# Patient Record
Sex: FEMALE | Race: WHITE | NOT HISPANIC OR LATINO | Employment: OTHER | URBAN - METROPOLITAN AREA
[De-identification: names, ages, dates, MRNs, and addresses within clinical notes are randomized per-mention and may not be internally consistent; named-entity substitution may affect disease eponyms.]

---

## 2017-01-19 NOTE — PATIENT DISCUSSION
NONPROLIFERATIVE DIABETIC RETINOPATHY OU: STABLE -  RETURN FOR FOLLOW-UP AS SCHEDULED FOR DILATED EYE EXAM.

## 2017-09-13 NOTE — PATIENT DISCUSSION
DIABETIC MACULAR EDEMA, OU:  STABLE - PT ASYMPTOMATIC AND HAS NOT NOTICED ANY CHANGES IN VISION. - NO TREATMENT TODAY. FOLLOW UP AS SCHEDULED.

## 2017-10-11 NOTE — PATIENT DISCUSSION
- Do not think that patient would be able to give a reliable HVF OD, would not be able to see the targets OS.

## 2017-10-11 NOTE — PATIENT DISCUSSION
- Follow up in 3 months for cataract check, or may call to schedule testing visit sooner if she wishes.

## 2017-10-11 NOTE — PATIENT DISCUSSION
- Discussed the a new glasses Rx will not improve her vision, but she would very likely experience a huge visual improvement after cataract surgery.

## 2017-10-11 NOTE — PATIENT DISCUSSION
- If no improvement at next visit will start erythromycin ointment. Must see improvement before planning cataract surgery.

## 2017-10-11 NOTE — PATIENT DISCUSSION
- Patient is generally happy with her vision but is interested in improving her ability to drive and see things at [facility]. Will consider cataract surgery.

## 2017-12-12 ENCOUNTER — CONSULT (OUTPATIENT)
Dept: URBAN - METROPOLITAN AREA CLINIC 43 | Facility: CLINIC | Age: 67
End: 2017-12-12

## 2017-12-12 DIAGNOSIS — H40.1132: ICD-10-CM

## 2017-12-12 DIAGNOSIS — H43.813: ICD-10-CM

## 2017-12-12 PROCEDURE — 2027F OPTIC NERVE HEAD EVAL DONE: CPT

## 2017-12-12 PROCEDURE — 92250 FUNDUS PHOTOGRAPHY W/I&R: CPT

## 2017-12-12 PROCEDURE — G8428 CUR MEDS NOT DOCUMENT: HCPCS

## 2017-12-12 PROCEDURE — 9222550 BILAT EXTENDED OPHTHALMOSCOPY, FIRST

## 2017-12-12 PROCEDURE — 0517F GLAUCOMA PLAN OF CARE DOCD: CPT

## 2017-12-12 PROCEDURE — 1036F TOBACCO NON-USER: CPT

## 2017-12-12 PROCEDURE — G8785 BP SCRN NO PERF AT INTERVAL: HCPCS

## 2017-12-12 PROCEDURE — 3285F IOP DOWN <15% OF PRE-SVC LVL: CPT

## 2017-12-12 PROCEDURE — 92014 COMPRE OPH EXAM EST PT 1/>: CPT

## 2017-12-12 ASSESSMENT — TONOMETRY
OS_IOP_MMHG: 10
OD_IOP_MMHG: 10

## 2017-12-12 ASSESSMENT — VISUAL ACUITY
OS_SC: 20/30-2
OD_CC: 20/20-2
OS_CC: 20/20-2
OD_SC: J6
OD_SC: 20/20
OS_CC: J1
OD_CC: J1
OS_SC: J6

## 2017-12-15 NOTE — PATIENT DISCUSSION
- I provided her with a new Rx for full frame reading glasses.  She understands that these glasses will not improve her near vision due to her advanced cataracts

## 2017-12-15 NOTE — PATIENT DISCUSSION
- Discussed with the patient in multiple different ways that a new glasses Rx will not improve her vision, but she would very likely experience a huge visual improvement after cataract surgery.

## 2018-01-10 ENCOUNTER — IOP CHECK (OUTPATIENT)
Dept: URBAN - METROPOLITAN AREA CLINIC 39 | Facility: CLINIC | Age: 68
End: 2018-01-10

## 2018-01-10 DIAGNOSIS — H40.1132: ICD-10-CM

## 2018-01-10 PROCEDURE — G8785 BP SCRN NO PERF AT INTERVAL: HCPCS

## 2018-01-10 PROCEDURE — 92012 INTRM OPH EXAM EST PATIENT: CPT

## 2018-01-10 PROCEDURE — 2027F OPTIC NERVE HEAD EVAL DONE: CPT

## 2018-01-10 PROCEDURE — G8428 CUR MEDS NOT DOCUMENT: HCPCS

## 2018-01-10 PROCEDURE — 1036F TOBACCO NON-USER: CPT

## 2018-01-10 PROCEDURE — 92133 CPTRZD OPH DX IMG PST SGM ON: CPT

## 2018-01-10 PROCEDURE — 3284F IOP RED >=15% PRE-NTRV LVL: CPT

## 2018-01-10 ASSESSMENT — TONOMETRY
OS_IOP_MMHG: 11
OD_IOP_MMHG: 10

## 2018-01-10 ASSESSMENT — VISUAL ACUITY
OD_CC: 20/25+2
OS_CC: 20/25+2

## 2018-01-18 NOTE — PATIENT DISCUSSION
DIABETIC MACULAR EDEMA, OD: INCREASED EDEMA TODAY -   AVASTIN (1.25MG) INTRAVITREAL INJECTION TODAY. RETURN AS SCHEDULED.

## 2018-01-31 NOTE — PATIENT DISCUSSION
- I discussed a variety of IOL options with the patient and provided her with written information.  Discussed standard monofocal, multifocal, extended depth of focus, and toric IOL options

## 2018-01-31 NOTE — PATIENT DISCUSSION
- Discussed the risks, benefits, and alternatives of cataract surgery including but not limited to infection, bleeding, posterior capsular tear, need for additional surgery including secondary IOL or vitrectomy, the continued need to wear glasses at both distance and near, and permanent loss of vision. There is no guarantee that cataract surgery will improve the patient's vision or reduce glare/halos.  The patient appears to understand and wishes to proceed with surgery, OD first.

## 2018-02-09 NOTE — PATIENT DISCUSSION
- Significant corneal astigmatism OD&gt;OS. Patient elects not to correct this with a toric IOL or an LRI. She understands that she will likely need glasses for both distance and near after surgery.

## 2018-02-28 NOTE — PATIENT DISCUSSION
New Prescription: ofloxacin (ofloxacin): drops: 0.3% 1 drop four times a day as directed into right eye 02-

## 2018-02-28 NOTE — PATIENT DISCUSSION
New Prescription: prednisolone acetate (prednisolone acetate): drops,suspension: 1% 1 drop four times a day as directed into right eye 02-

## 2018-02-28 NOTE — PATIENT DISCUSSION
- Pred forte QID (sent repeat Rx to patient's pharmacy, discussed the importance of starting this drop as soon as possible)

## 2018-02-28 NOTE — PATIENT DISCUSSION
New Prescription: ketorolac (ketorolac): drops: 0.5% 1 drop four times a day as directed into right eye 02-

## 2018-03-01 NOTE — PATIENT DISCUSSION
- Patient has been completely noncomplaint with her postop drops, has not used Pred forte or Besivance at all since surgery

## 2018-03-01 NOTE — PATIENT DISCUSSION
- Provided detailed written instructions in two formats and discussed drop schedule at length with patient and a family member

## 2018-03-29 NOTE — PATIENT DISCUSSION
- Patient is happy without glasses for now.  She will wear OTC reading glasses for near work, recommend +2.50-2.75

## 2018-04-26 ENCOUNTER — IOP CHECK (OUTPATIENT)
Dept: URBAN - METROPOLITAN AREA CLINIC 43 | Facility: CLINIC | Age: 68
End: 2018-04-26

## 2018-04-26 DIAGNOSIS — H40.1132: ICD-10-CM

## 2018-04-26 PROCEDURE — 2027F OPTIC NERVE HEAD EVAL DONE: CPT

## 2018-04-26 PROCEDURE — G8428 CUR MEDS NOT DOCUMENT: HCPCS

## 2018-04-26 PROCEDURE — G8785 BP SCRN NO PERF AT INTERVAL: HCPCS

## 2018-04-26 PROCEDURE — 3284F IOP RED >=15% PRE-NTRV LVL: CPT

## 2018-04-26 PROCEDURE — 92012 INTRM OPH EXAM EST PATIENT: CPT

## 2018-04-26 PROCEDURE — 1036F TOBACCO NON-USER: CPT

## 2018-04-26 ASSESSMENT — VISUAL ACUITY
OS_SC: 20/30-1
OD_SC: 20/25-1
OS_PH: 20/25

## 2018-04-26 ASSESSMENT — TONOMETRY
OD_IOP_MMHG: 11
OS_IOP_MMHG: 11

## 2018-05-10 NOTE — PATIENT DISCUSSION
DIABETIC MACULAR EDEMA, OU:  STABLE EDEMA - PT FEELS VISION IS GOOD- NO TREATMENT TODAY. FOLLOW UP AS SCHEDULED.

## 2018-08-03 NOTE — PATIENT DISCUSSION
New Prescription: latanoprost (latanoprost): drops: 0.005% 1 drop at bedtime as directed into both eyes 08-

## 2018-12-03 ENCOUNTER — ESTABLISHED COMPREHENSIVE EXAM (OUTPATIENT)
Dept: URBAN - METROPOLITAN AREA CLINIC 39 | Facility: CLINIC | Age: 68
End: 2018-12-03

## 2018-12-03 DIAGNOSIS — H43.813: ICD-10-CM

## 2018-12-03 DIAGNOSIS — H40.1132: ICD-10-CM

## 2018-12-03 DIAGNOSIS — H25.13: ICD-10-CM

## 2018-12-03 PROCEDURE — 92250 FUNDUS PHOTOGRAPHY W/I&R: CPT

## 2018-12-03 PROCEDURE — 2027F OPTIC NERVE HEAD EVAL DONE: CPT

## 2018-12-03 PROCEDURE — 3285F IOP DOWN <15% OF PRE-SVC LVL: CPT

## 2018-12-03 PROCEDURE — G9903 PT SCRN TBCO ID AS NON USER: HCPCS

## 2018-12-03 PROCEDURE — 92014 COMPRE OPH EXAM EST PT 1/>: CPT

## 2018-12-03 PROCEDURE — G8785 BP SCRN NO PERF AT INTERVAL: HCPCS

## 2018-12-03 PROCEDURE — 9222650 BILAT EXTENDED OPHTHALMOSCOPY, F/U

## 2018-12-03 PROCEDURE — 0517F GLAUCOMA PLAN OF CARE DOCD: CPT

## 2018-12-03 PROCEDURE — G8428 CUR MEDS NOT DOCUMENT: HCPCS

## 2018-12-03 PROCEDURE — 1036F TOBACCO NON-USER: CPT

## 2018-12-03 PROCEDURE — 92015 DETERMINE REFRACTIVE STATE: CPT

## 2018-12-03 RX ORDER — NETARSUDIL 0.2 MG/ML: 1 SOLUTION/ DROPS OPHTHALMIC; TOPICAL EVERY EVENING

## 2018-12-03 ASSESSMENT — TONOMETRY
OS_IOP_MMHG: 10
OD_IOP_MMHG: 9

## 2018-12-03 ASSESSMENT — VISUAL ACUITY
OD_CC: J3
OD_SC: J5
OS_SC: 20/40-2
OS_CC: J3
OS_SC: J5
OD_SC: 20/25-2
OD_CC: 20/20

## 2019-01-16 ENCOUNTER — IOP CHECK (OUTPATIENT)
Dept: URBAN - METROPOLITAN AREA CLINIC 39 | Facility: CLINIC | Age: 69
End: 2019-01-16

## 2019-01-16 DIAGNOSIS — H40.1132: ICD-10-CM

## 2019-01-16 PROCEDURE — 92012 INTRM OPH EXAM EST PATIENT: CPT

## 2019-01-16 PROCEDURE — 2027F OPTIC NERVE HEAD EVAL DONE: CPT

## 2019-01-16 PROCEDURE — 3284F IOP RED >=15% PRE-NTRV LVL: CPT

## 2019-01-16 PROCEDURE — G8428 CUR MEDS NOT DOCUMENT: HCPCS

## 2019-01-16 PROCEDURE — 1036F TOBACCO NON-USER: CPT

## 2019-01-16 PROCEDURE — G8785 BP SCRN NO PERF AT INTERVAL: HCPCS

## 2019-01-16 PROCEDURE — G9903 PT SCRN TBCO ID AS NON USER: HCPCS

## 2019-01-16 ASSESSMENT — TONOMETRY
OD_IOP_MMHG: 06
OS_IOP_MMHG: 07

## 2019-01-16 ASSESSMENT — VISUAL ACUITY
OD_SC: 20/25-2
OS_PH: 20/25-2
OS_SC: 20/40+1

## 2019-01-24 ENCOUNTER — EST. PATIENT EMERGENCY (OUTPATIENT)
Dept: URBAN - METROPOLITAN AREA CLINIC 39 | Facility: CLINIC | Age: 69
End: 2019-01-24

## 2019-01-24 DIAGNOSIS — H10.13: ICD-10-CM

## 2019-01-24 PROCEDURE — 92012 INTRM OPH EXAM EST PATIENT: CPT

## 2019-01-24 PROCEDURE — G8427 DOCREV CUR MEDS BY ELIG CLIN: HCPCS

## 2019-01-24 PROCEDURE — 1036F TOBACCO NON-USER: CPT

## 2019-01-24 PROCEDURE — G9903 PT SCRN TBCO ID AS NON USER: HCPCS

## 2019-01-24 ASSESSMENT — TONOMETRY
OD_IOP_MMHG: 08
OS_IOP_MMHG: 07

## 2019-01-24 ASSESSMENT — VISUAL ACUITY
OD_CC: 20/20-2
OS_CC: 20/25

## 2019-02-21 ENCOUNTER — FOLLOW UP (OUTPATIENT)
Dept: URBAN - METROPOLITAN AREA CLINIC 43 | Facility: CLINIC | Age: 69
End: 2019-02-21

## 2019-02-21 DIAGNOSIS — H10.13: ICD-10-CM

## 2019-02-21 PROCEDURE — 92012 INTRM OPH EXAM EST PATIENT: CPT

## 2019-02-21 ASSESSMENT — TONOMETRY
OD_IOP_MMHG: 09
OS_IOP_MMHG: 10

## 2019-02-21 ASSESSMENT — VISUAL ACUITY
OD_CC: 20/25+1
OS_CC: 20/25+1

## 2019-04-17 ENCOUNTER — FOLLOW UP (OUTPATIENT)
Dept: URBAN - METROPOLITAN AREA CLINIC 39 | Facility: CLINIC | Age: 69
End: 2019-04-17

## 2019-04-17 DIAGNOSIS — H40.1132: ICD-10-CM

## 2019-04-17 PROCEDURE — 92012 INTRM OPH EXAM EST PATIENT: CPT

## 2019-04-17 PROCEDURE — 92133 CPTRZD OPH DX IMG PST SGM ON: CPT

## 2019-04-17 ASSESSMENT — TONOMETRY
OS_IOP_MMHG: 09
OD_IOP_MMHG: 09

## 2019-04-17 ASSESSMENT — VISUAL ACUITY
OD_CC: 20/20-1
OS_CC: 20/20-2

## 2019-08-16 NOTE — PATIENT DISCUSSION
NONPROLIFERATIVE DIABETIC RETINOPATHY OU: STABLE RETINOPATHY -  RETURN FOR FOLLOW-UP AS SCHEDULED FOR DILATED EYE EXAM. Yes - the patient is able to be screened

## 2019-10-17 ENCOUNTER — ESTABLISHED COMPREHENSIVE EXAM (OUTPATIENT)
Dept: URBAN - METROPOLITAN AREA CLINIC 43 | Facility: CLINIC | Age: 69
End: 2019-10-17

## 2019-10-17 DIAGNOSIS — H25.813: ICD-10-CM

## 2019-10-17 DIAGNOSIS — H04.123: ICD-10-CM

## 2019-10-17 DIAGNOSIS — H43.813: ICD-10-CM

## 2019-10-17 DIAGNOSIS — H40.1132: ICD-10-CM

## 2019-10-17 PROCEDURE — 92014 COMPRE OPH EXAM EST PT 1/>: CPT

## 2019-10-17 PROCEDURE — 9222650 BILAT EXTENDED OPHTHALMOSCOPY, F/U

## 2019-10-17 PROCEDURE — 92250 FUNDUS PHOTOGRAPHY W/I&R: CPT

## 2019-10-17 RX ORDER — BRIMONIDINE TARTRATE 1 MG/ML
1 SOLUTION/ DROPS OPHTHALMIC TWICE A DAY
Start: 2019-10-17

## 2019-10-17 ASSESSMENT — VISUAL ACUITY
OS_SC: J12
OD_SC: J12
OD_CC: J1
OS_CC: 20/40
OD_CC: 20/25
OD_SC: 20/40
OS_CC: J2
OS_SC: 20/80-1

## 2019-10-17 ASSESSMENT — TONOMETRY
OD_IOP_MMHG: 13
OS_IOP_MMHG: 12

## 2019-11-19 ENCOUNTER — IOP CHECK (OUTPATIENT)
Dept: URBAN - METROPOLITAN AREA CLINIC 43 | Facility: CLINIC | Age: 69
End: 2019-11-19

## 2019-11-19 DIAGNOSIS — H40.1132: ICD-10-CM

## 2019-11-19 PROCEDURE — 92012 INTRM OPH EXAM EST PATIENT: CPT

## 2019-11-19 RX ORDER — CYCLOPENTOLATE HYDROCHLORIDE 10 MG/ML: 1 SOLUTION OPHTHALMIC TWICE A DAY

## 2019-11-19 RX ORDER — MOXIFLOXACIN HYDROCHLORIDE 5 MG/ML: 1 SOLUTION/ DROPS OPHTHALMIC

## 2019-11-19 RX ORDER — DUREZOL 0.5 MG/ML: 1 EMULSION OPHTHALMIC

## 2019-11-19 ASSESSMENT — VISUAL ACUITY
OS_CC: 20/40+2
OD_CC: 20/20-1
OS_PH: 20/25

## 2019-11-19 ASSESSMENT — TONOMETRY
OD_IOP_MMHG: 09
OS_IOP_MMHG: 09

## 2019-11-25 ENCOUNTER — PRE-OP/H&P (OUTPATIENT)
Dept: URBAN - METROPOLITAN AREA CLINIC 39 | Facility: CLINIC | Age: 69
End: 2019-11-25

## 2019-11-25 ENCOUNTER — SURGERY/PROCEDURE (OUTPATIENT)
Dept: URBAN - METROPOLITAN AREA CLINIC 43 | Facility: CLINIC | Age: 69
End: 2019-11-25

## 2019-11-25 DIAGNOSIS — H40.1122: ICD-10-CM

## 2019-11-25 DIAGNOSIS — H40.1132: ICD-10-CM

## 2019-11-25 PROCEDURE — 99211HP H&P OFFICE/OUTPATIENT VISIT, EST

## 2019-11-25 PROCEDURE — 66170 GLAUCOMA SURGERY: CPT

## 2019-11-26 ENCOUNTER — 1 DAY POST-OP (OUTPATIENT)
Dept: URBAN - METROPOLITAN AREA CLINIC 43 | Facility: CLINIC | Age: 69
End: 2019-11-26

## 2019-11-26 DIAGNOSIS — H40.1132: ICD-10-CM

## 2019-11-26 DIAGNOSIS — H40.1122: ICD-10-CM

## 2019-11-26 PROCEDURE — 66999PO NON CO-MANAGED OTHER SURGERY PO

## 2019-11-26 ASSESSMENT — TONOMETRY
OS_IOP_MMHG: 06
OD_IOP_MMHG: 09

## 2019-11-26 ASSESSMENT — VISUAL ACUITY
OD_CC: 20/30+2
OS_CC: 20/60

## 2019-12-03 ENCOUNTER — POST-OP (OUTPATIENT)
Dept: URBAN - METROPOLITAN AREA CLINIC 35 | Facility: CLINIC | Age: 69
End: 2019-12-03

## 2019-12-03 DIAGNOSIS — H40.1132: ICD-10-CM

## 2019-12-03 DIAGNOSIS — H40.1122: ICD-10-CM

## 2019-12-03 PROCEDURE — 99024 POSTOP FOLLOW-UP VISIT: CPT

## 2019-12-03 ASSESSMENT — TONOMETRY
OS_IOP_MMHG: 22
OD_IOP_MMHG: 10

## 2019-12-03 ASSESSMENT — VISUAL ACUITY
OD_CC: 20/20-1
OS_CC: 20/40+2

## 2019-12-04 ENCOUNTER — POST-OP (OUTPATIENT)
Dept: URBAN - METROPOLITAN AREA CLINIC 39 | Facility: CLINIC | Age: 69
End: 2019-12-04

## 2019-12-04 DIAGNOSIS — H40.1132: ICD-10-CM

## 2019-12-04 DIAGNOSIS — H40.1122: ICD-10-CM

## 2019-12-04 PROCEDURE — 99024 POSTOP FOLLOW-UP VISIT: CPT

## 2019-12-04 ASSESSMENT — TONOMETRY
OD_IOP_MMHG: 11
OS_IOP_MMHG: 22

## 2019-12-04 ASSESSMENT — VISUAL ACUITY
OD_CC: 20/20
OS_CC: 20/30+1

## 2019-12-06 ENCOUNTER — EST. PATIENT EMERGENCY (OUTPATIENT)
Dept: URBAN - METROPOLITAN AREA CLINIC 39 | Facility: CLINIC | Age: 69
End: 2019-12-06

## 2019-12-06 DIAGNOSIS — H40.1122: ICD-10-CM

## 2019-12-06 DIAGNOSIS — H40.1132: ICD-10-CM

## 2019-12-06 PROCEDURE — 66999PO NON CO-MANAGED OTHER SURGERY PO

## 2019-12-06 ASSESSMENT — VISUAL ACUITY
OS_CC: 20/30-1
OD_CC: 20/20-2

## 2019-12-06 ASSESSMENT — TONOMETRY
OD_IOP_MMHG: 15
OS_IOP_MMHG: 06

## 2019-12-11 ENCOUNTER — POST-OP (OUTPATIENT)
Dept: URBAN - METROPOLITAN AREA CLINIC 39 | Facility: CLINIC | Age: 69
End: 2019-12-11

## 2019-12-11 DIAGNOSIS — H40.1122: ICD-10-CM

## 2019-12-11 PROCEDURE — 99024 POSTOP FOLLOW-UP VISIT: CPT

## 2019-12-11 ASSESSMENT — VISUAL ACUITY
OD_CC: 20/20-2
OS_CC: 20/30-2

## 2019-12-11 ASSESSMENT — TONOMETRY
OS_IOP_MMHG: 10
OD_IOP_MMHG: 14

## 2019-12-18 ENCOUNTER — POST-OP (OUTPATIENT)
Dept: URBAN - METROPOLITAN AREA CLINIC 39 | Facility: CLINIC | Age: 69
End: 2019-12-18

## 2019-12-18 DIAGNOSIS — H40.1122: ICD-10-CM

## 2019-12-18 PROCEDURE — 66999PO NON CO-MANAGED OTHER SURGERY PO

## 2019-12-18 ASSESSMENT — VISUAL ACUITY
OD_CC: 20/20-2
OS_CC: 20/40

## 2019-12-18 ASSESSMENT — TONOMETRY
OD_IOP_MMHG: 13
OS_IOP_MMHG: 08

## 2019-12-30 ENCOUNTER — ESTABLISHED PATIENT (OUTPATIENT)
Dept: URBAN - METROPOLITAN AREA CLINIC 39 | Facility: CLINIC | Age: 69
End: 2019-12-30

## 2019-12-30 DIAGNOSIS — H40.1122: ICD-10-CM

## 2019-12-30 PROCEDURE — 66999PO NON CO-MANAGED OTHER SURGERY PO

## 2019-12-30 ASSESSMENT — VISUAL ACUITY
OD_CC: 20/40
OD_SC: 20/25+1
OD_CC: J1
OD_SC: J3

## 2019-12-30 ASSESSMENT — TONOMETRY
OD_IOP_MMHG: 13
OS_IOP_MMHG: 09

## 2020-01-08 ENCOUNTER — POST-OP (OUTPATIENT)
Dept: URBAN - METROPOLITAN AREA CLINIC 39 | Facility: CLINIC | Age: 70
End: 2020-01-08

## 2020-01-08 DIAGNOSIS — H40.1132: ICD-10-CM

## 2020-01-08 DIAGNOSIS — H40.1122: ICD-10-CM

## 2020-01-08 PROCEDURE — 99024 POSTOP FOLLOW-UP VISIT: CPT

## 2020-01-08 ASSESSMENT — VISUAL ACUITY
OS_CC: 20/40-1
OS_PH: 20/30-1
OD_CC: 20/20-1

## 2020-01-08 ASSESSMENT — TONOMETRY
OS_IOP_MMHG: 08
OD_IOP_MMHG: 14

## 2020-01-21 ENCOUNTER — EST. PATIENT EMERGENCY (OUTPATIENT)
Dept: URBAN - METROPOLITAN AREA CLINIC 38 | Facility: CLINIC | Age: 70
End: 2020-01-21

## 2020-01-21 DIAGNOSIS — H40.1122: ICD-10-CM

## 2020-01-21 DIAGNOSIS — S05.02XA: ICD-10-CM

## 2020-01-21 DIAGNOSIS — H40.1132: ICD-10-CM

## 2020-01-21 PROCEDURE — 99213 OFFICE O/P EST LOW 20 MIN: CPT

## 2020-01-21 ASSESSMENT — VISUAL ACUITY
OU_CC: 20/20-2
OD_CC: 20/20-1

## 2020-01-21 ASSESSMENT — TONOMETRY
OD_IOP_MMHG: 14
OS_IOP_MMHG: 12

## 2020-01-23 ENCOUNTER — FOLLOW UP (OUTPATIENT)
Dept: URBAN - METROPOLITAN AREA CLINIC 38 | Facility: CLINIC | Age: 70
End: 2020-01-23

## 2020-01-23 DIAGNOSIS — H20.00: ICD-10-CM

## 2020-01-23 PROCEDURE — 99212 OFFICE O/P EST SF 10 MIN: CPT

## 2020-01-23 ASSESSMENT — VISUAL ACUITY
OU_CC: 20/20-1
OD_CC: 20/20-1

## 2020-01-23 ASSESSMENT — TONOMETRY
OS_IOP_MMHG: 12
OD_IOP_MMHG: 13

## 2020-02-05 ENCOUNTER — POST-OP (OUTPATIENT)
Dept: URBAN - METROPOLITAN AREA CLINIC 39 | Facility: CLINIC | Age: 70
End: 2020-02-05

## 2020-02-05 DIAGNOSIS — H20.00: ICD-10-CM

## 2020-02-05 DIAGNOSIS — H40.1122: ICD-10-CM

## 2020-02-05 PROCEDURE — 99024 POSTOP FOLLOW-UP VISIT: CPT

## 2020-02-05 ASSESSMENT — TONOMETRY
OS_IOP_MMHG: 13
OD_IOP_MMHG: 15

## 2020-02-05 ASSESSMENT — VISUAL ACUITY
OS_CC: 20/20
OD_CC: 20/20-1

## 2020-02-07 ENCOUNTER — EST. PATIENT EMERGENCY (OUTPATIENT)
Dept: URBAN - METROPOLITAN AREA CLINIC 38 | Facility: CLINIC | Age: 70
End: 2020-02-07

## 2020-02-07 DIAGNOSIS — H11.31: ICD-10-CM

## 2020-02-07 PROCEDURE — 92012 INTRM OPH EXAM EST PATIENT: CPT

## 2020-02-07 ASSESSMENT — TONOMETRY
OD_IOP_MMHG: 10
OS_IOP_MMHG: 15
OD_IOP_MMHG: 15
OS_IOP_MMHG: 16

## 2020-02-07 ASSESSMENT — VISUAL ACUITY
OS_CC: 20/25-1
OD_CC: 20/20

## 2020-03-25 ENCOUNTER — IOP CHECK (OUTPATIENT)
Dept: URBAN - METROPOLITAN AREA CLINIC 39 | Facility: CLINIC | Age: 70
End: 2020-03-25

## 2020-03-25 DIAGNOSIS — H40.1122: ICD-10-CM

## 2020-03-25 DIAGNOSIS — H40.1132: ICD-10-CM

## 2020-03-25 PROCEDURE — 92012 INTRM OPH EXAM EST PATIENT: CPT

## 2020-03-25 ASSESSMENT — VISUAL ACUITY
OS_PH: 20/25-1
OS_CC: 20/40
OD_CC: 20/30+2

## 2020-03-25 ASSESSMENT — TONOMETRY
OS_IOP_MMHG: 11
OD_IOP_MMHG: 10

## 2020-05-13 ENCOUNTER — IOP CHECK (OUTPATIENT)
Dept: URBAN - METROPOLITAN AREA CLINIC 39 | Facility: CLINIC | Age: 70
End: 2020-05-13

## 2020-05-13 DIAGNOSIS — H40.1122: ICD-10-CM

## 2020-05-13 DIAGNOSIS — H04.123: ICD-10-CM

## 2020-05-13 DIAGNOSIS — H40.1132: ICD-10-CM

## 2020-05-13 DIAGNOSIS — H43.813: ICD-10-CM

## 2020-05-13 PROCEDURE — 92012 INTRM OPH EXAM EST PATIENT: CPT

## 2020-05-13 ASSESSMENT — TONOMETRY
OS_IOP_MMHG: 10
OD_IOP_MMHG: 10

## 2020-05-13 ASSESSMENT — VISUAL ACUITY
OD_CC: 20/25-2
OS_CC: 20/40-2

## 2020-05-15 ENCOUNTER — REFRACTION ONLY (OUTPATIENT)
Dept: URBAN - METROPOLITAN AREA CLINIC 38 | Facility: CLINIC | Age: 70
End: 2020-05-15

## 2020-05-15 DIAGNOSIS — H25.813: ICD-10-CM

## 2020-05-15 PROCEDURE — 92015 DETERMINE REFRACTIVE STATE: CPT

## 2020-05-15 ASSESSMENT — VISUAL ACUITY
OD_CC: J4
OS_CC: 20/25-2
OS_CC: J6
OU_CC: J2
OU_CC: 20/25-1
OD_CC: 20/25-1

## 2020-05-21 ENCOUNTER — EST. PATIENT EMERGENCY (OUTPATIENT)
Dept: URBAN - METROPOLITAN AREA CLINIC 38 | Facility: CLINIC | Age: 70
End: 2020-05-21

## 2020-05-21 DIAGNOSIS — H11.32: ICD-10-CM

## 2020-05-21 DIAGNOSIS — H02.883: ICD-10-CM

## 2020-05-21 DIAGNOSIS — H10.33: ICD-10-CM

## 2020-05-21 DIAGNOSIS — H04.123: ICD-10-CM

## 2020-05-21 DIAGNOSIS — H02.886: ICD-10-CM

## 2020-05-21 PROCEDURE — 99212 OFFICE O/P EST SF 10 MIN: CPT

## 2020-05-21 RX ORDER — TOBRAMYCIN 3 MG/ML
1 SOLUTION/ DROPS OPHTHALMIC
Start: 2020-05-21 | End: 2020-05-27

## 2020-05-21 ASSESSMENT — VISUAL ACUITY
OD_CC: 20/25-1
OS_CC: 20/25
OU_CC: 20/20-2

## 2020-05-21 ASSESSMENT — TONOMETRY: OS_IOP_MMHG: 10

## 2020-05-26 ENCOUNTER — CATARACT CONSULT (OUTPATIENT)
Dept: URBAN - METROPOLITAN AREA CLINIC 43 | Facility: CLINIC | Age: 70
End: 2020-05-26

## 2020-05-26 DIAGNOSIS — H02.883: ICD-10-CM

## 2020-05-26 DIAGNOSIS — H02.886: ICD-10-CM

## 2020-05-26 DIAGNOSIS — H25.813: ICD-10-CM

## 2020-05-26 DIAGNOSIS — H11.32: ICD-10-CM

## 2020-05-26 DIAGNOSIS — H40.1122: ICD-10-CM

## 2020-05-26 DIAGNOSIS — H40.1132: ICD-10-CM

## 2020-05-26 PROCEDURE — 92014 COMPRE OPH EXAM EST PT 1/>: CPT

## 2020-05-26 PROCEDURE — 92136TC INTERFEROMETRY - TECHNICAL COMPONENT

## 2020-05-26 PROCEDURE — 92025-1 CORNEAL TOPOGRAPHY, INS

## 2020-05-26 ASSESSMENT — VISUAL ACUITY
OS_CC: J4
OS_BAT: 20/50
OD_CC: J2
OS_CC: 20/30-1
OS_SC: 20/50-1
OD_SC: J10
OD_SC: 20/30+1
OD_CC: 20/25-1
OS_SC: J12
OD_BAT: 20/50

## 2020-05-26 ASSESSMENT — TONOMETRY
OD_IOP_MMHG: 12
OS_IOP_MMHG: 09

## 2020-06-17 ENCOUNTER — EST. PATIENT EMERGENCY (OUTPATIENT)
Dept: URBAN - METROPOLITAN AREA CLINIC 39 | Facility: CLINIC | Age: 70
End: 2020-06-17

## 2020-06-17 DIAGNOSIS — H40.1122: ICD-10-CM

## 2020-06-17 DIAGNOSIS — H02.883: ICD-10-CM

## 2020-06-17 DIAGNOSIS — H40.1112: ICD-10-CM

## 2020-06-17 DIAGNOSIS — H25.813: ICD-10-CM

## 2020-06-17 PROCEDURE — 92012 INTRM OPH EXAM EST PATIENT: CPT

## 2020-06-17 ASSESSMENT — TONOMETRY
OS_IOP_MMHG: 09
OD_IOP_MMHG: 12

## 2020-06-17 ASSESSMENT — VISUAL ACUITY
OS_CC: 20/30-2
OD_CC: 20/25-1

## 2020-10-29 ENCOUNTER — EST. PATIENT EMERGENCY (OUTPATIENT)
Dept: URBAN - METROPOLITAN AREA CLINIC 38 | Facility: CLINIC | Age: 70
End: 2020-10-29

## 2020-10-29 DIAGNOSIS — H02.883: ICD-10-CM

## 2020-10-29 DIAGNOSIS — H02.886: ICD-10-CM

## 2020-10-29 DIAGNOSIS — H04.123: ICD-10-CM

## 2020-10-29 PROCEDURE — 99213 OFFICE O/P EST LOW 20 MIN: CPT

## 2020-10-29 RX ORDER — AMITRIPTYLINE HCL 10 MG/1: TABLET, FILM COATED ORAL

## 2020-10-29 RX ORDER — CYCLOSPORINE 0.5 MG/ML
1 EMULSION OPHTHALMIC TWICE A DAY
Start: 2020-10-29

## 2020-10-29 RX ORDER — LOTEPREDNOL ETABONATE 5 MG/ML
1 SUSPENSION/ DROPS OPHTHALMIC
Start: 2020-10-29 | End: 2020-11-04

## 2020-10-29 ASSESSMENT — TONOMETRY
OS_IOP_MMHG: 09
OD_IOP_MMHG: 12

## 2020-10-29 ASSESSMENT — VISUAL ACUITY
OD_CC: 20/20-2
OS_PH: 20/25-2
OU_CC: 20/25-1
OS_CC: 20/60-2

## 2020-11-02 ENCOUNTER — CATARACT EVALUATION (OUTPATIENT)
Dept: URBAN - METROPOLITAN AREA CLINIC 39 | Facility: CLINIC | Age: 70
End: 2020-11-02

## 2020-11-02 DIAGNOSIS — H16.223: ICD-10-CM

## 2020-11-02 DIAGNOSIS — H35.371: ICD-10-CM

## 2020-11-02 DIAGNOSIS — H11.32: ICD-10-CM

## 2020-11-02 DIAGNOSIS — H40.1112: ICD-10-CM

## 2020-11-02 DIAGNOSIS — H02.88A: ICD-10-CM

## 2020-11-02 DIAGNOSIS — H57.02: ICD-10-CM

## 2020-11-02 DIAGNOSIS — H18.513: ICD-10-CM

## 2020-11-02 DIAGNOSIS — H40.1122: ICD-10-CM

## 2020-11-02 DIAGNOSIS — H25.811: ICD-10-CM

## 2020-11-02 DIAGNOSIS — H25.812: ICD-10-CM

## 2020-11-02 DIAGNOSIS — H02.88B: ICD-10-CM

## 2020-11-02 DIAGNOSIS — H43.813: ICD-10-CM

## 2020-11-02 PROCEDURE — 92014 COMPRE OPH EXAM EST PT 1/>: CPT

## 2020-11-02 PROCEDURE — V2799PMN IMPRIMIS PRED-MOXI-NEPAF 5ML

## 2020-11-02 PROCEDURE — 92136TC INTERFEROMETRY - TECHNICAL COMPONENT

## 2020-11-02 PROCEDURE — 92286 ANT SGM IMG I&R SPECLR MIC: CPT

## 2020-11-02 PROCEDURE — 92133 CPTRZD OPH DX IMG PST SGM ON: CPT

## 2020-11-02 PROCEDURE — 92134 CPTRZ OPH DX IMG PST SGM RTA: CPT

## 2020-11-02 PROCEDURE — 92025-2 CORNEAL TOPOGRAPHY, PT

## 2020-11-02 ASSESSMENT — VISUAL ACUITY
OS_AM: 20/20
OS_BAT: 20/400
OD_SC: J12
OD_CC: 20/30+2
OS_SC: J6
OD_RAM: 20/20
OD_CC: J3
OS_CC: J8
OD_SC: 20/25
OS_CC: 20/100+1
OD_BAT: 20/50
OS_SC: 20/100

## 2020-11-02 ASSESSMENT — TONOMETRY
OD_IOP_MMHG: 10
OS_IOP_MMHG: 12
OS_IOP_MMHG: 10
OD_IOP_MMHG: 12

## 2020-11-04 ENCOUNTER — IOP CHECK (OUTPATIENT)
Dept: URBAN - METROPOLITAN AREA CLINIC 39 | Facility: CLINIC | Age: 70
End: 2020-11-04

## 2020-11-04 DIAGNOSIS — H40.1122: ICD-10-CM

## 2020-11-04 DIAGNOSIS — H40.1112: ICD-10-CM

## 2020-11-04 DIAGNOSIS — H11.32: ICD-10-CM

## 2020-11-04 DIAGNOSIS — H25.812: ICD-10-CM

## 2020-11-04 DIAGNOSIS — H25.811: ICD-10-CM

## 2020-11-04 PROCEDURE — 92012 INTRM OPH EXAM EST PATIENT: CPT

## 2020-11-04 ASSESSMENT — TONOMETRY
OS_IOP_MMHG: 11
OD_IOP_MMHG: 10

## 2020-11-04 ASSESSMENT — VISUAL ACUITY
OS_CC: 20/50
OD_CC: 20/20-2

## 2020-11-14 ENCOUNTER — EST. PATIENT EMERGENCY (OUTPATIENT)
Dept: URBAN - METROPOLITAN AREA CLINIC 35 | Facility: CLINIC | Age: 70
End: 2020-11-14

## 2020-11-14 DIAGNOSIS — H40.1112: ICD-10-CM

## 2020-11-14 DIAGNOSIS — H25.811: ICD-10-CM

## 2020-11-14 DIAGNOSIS — H11.32: ICD-10-CM

## 2020-11-14 DIAGNOSIS — H25.812: ICD-10-CM

## 2020-11-14 DIAGNOSIS — H40.1122: ICD-10-CM

## 2020-11-14 PROCEDURE — 92014 COMPRE OPH EXAM EST PT 1/>: CPT

## 2020-11-14 ASSESSMENT — TONOMETRY
OS_IOP_MMHG: 13
OD_IOP_MMHG: 13

## 2020-11-14 ASSESSMENT — VISUAL ACUITY
OS_CC: 20/60-1
OD_CC: 20/20-1

## 2020-11-16 ENCOUNTER — DIAGNOSTICS ONLY (OUTPATIENT)
Dept: URBAN - METROPOLITAN AREA CLINIC 39 | Facility: CLINIC | Age: 70
End: 2020-11-16

## 2020-11-16 DIAGNOSIS — H25.812: ICD-10-CM

## 2020-11-16 DIAGNOSIS — H25.811: ICD-10-CM

## 2020-11-16 PROCEDURE — 92025NC COMP. CORNEAL TOPO, UNI OR BILAT,

## 2020-11-16 PROCEDURE — 92136TCNC INTERFEROMETRY -TECHNICAL COMPONENT NO CHARGE

## 2020-11-16 PROCEDURE — 99211T TECH SERVICE

## 2020-11-23 ENCOUNTER — PRE-OP/H&P (OUTPATIENT)
Dept: URBAN - METROPOLITAN AREA CLINIC 39 | Facility: CLINIC | Age: 70
End: 2020-11-23

## 2020-11-23 ENCOUNTER — SURGERY/PROCEDURE (OUTPATIENT)
Dept: URBAN - METROPOLITAN AREA CLINIC 39 | Facility: CLINIC | Age: 70
End: 2020-11-23

## 2020-11-23 DIAGNOSIS — H25.812: ICD-10-CM

## 2020-11-23 DIAGNOSIS — H11.32: ICD-10-CM

## 2020-11-23 DIAGNOSIS — H40.1112: ICD-10-CM

## 2020-11-23 DIAGNOSIS — H25.811: ICD-10-CM

## 2020-11-23 DIAGNOSIS — H40.1122: ICD-10-CM

## 2020-11-23 PROCEDURE — 66984CV REMOVE CATARACT, INSERT LENS, CUSTOM VISION

## 2020-11-23 PROCEDURE — 99211T TECH SERVICE

## 2020-11-23 PROCEDURE — 65772LRI LRI DURING CAT SX

## 2020-11-24 ENCOUNTER — 1 DAY POST-OP (OUTPATIENT)
Dept: URBAN - METROPOLITAN AREA CLINIC 38 | Facility: CLINIC | Age: 70
End: 2020-11-24

## 2020-11-24 DIAGNOSIS — H11.32: ICD-10-CM

## 2020-11-24 DIAGNOSIS — Z96.1: ICD-10-CM

## 2020-11-24 PROCEDURE — 99024 POSTOP FOLLOW-UP VISIT: CPT

## 2020-11-24 ASSESSMENT — TONOMETRY
OS_IOP_MMHG: 11
OD_IOP_MMHG: 14

## 2020-11-24 ASSESSMENT — VISUAL ACUITY
OS_SC: 20/25
OD_SC: J12
OS_SC: J12
OD_SC: 20/20

## 2020-11-25 ENCOUNTER — EST. PATIENT EMERGENCY (OUTPATIENT)
Dept: URBAN - METROPOLITAN AREA CLINIC 38 | Facility: CLINIC | Age: 70
End: 2020-11-25

## 2020-11-25 DIAGNOSIS — H11.32: ICD-10-CM

## 2020-11-25 DIAGNOSIS — B00.52: ICD-10-CM

## 2020-11-25 PROCEDURE — 92012 INTRM OPH EXAM EST PATIENT: CPT

## 2020-11-25 RX ORDER — VALACYCLOVIR HYDROCHLORIDE 500 MG/1: 1 TABLET, FILM COATED ORAL

## 2020-11-25 ASSESSMENT — VISUAL ACUITY
OD_CC: 20/20-2
OS_SC: 20/25

## 2020-11-25 ASSESSMENT — TONOMETRY
OD_IOP_MMHG: 13
OS_IOP_MMHG: 12

## 2020-12-01 ENCOUNTER — POST-OP CATARACT (OUTPATIENT)
Dept: URBAN - METROPOLITAN AREA CLINIC 38 | Facility: CLINIC | Age: 70
End: 2020-12-01

## 2020-12-01 DIAGNOSIS — H35.371: ICD-10-CM

## 2020-12-01 DIAGNOSIS — Z96.1: ICD-10-CM

## 2020-12-01 DIAGNOSIS — H11.32: ICD-10-CM

## 2020-12-01 DIAGNOSIS — B00.52: ICD-10-CM

## 2020-12-01 PROCEDURE — 92134 CPTRZ OPH DX IMG PST SGM RTA: CPT

## 2020-12-01 PROCEDURE — 99024 POSTOP FOLLOW-UP VISIT: CPT

## 2020-12-01 ASSESSMENT — VISUAL ACUITY
OS_SC: J3
OD_SC: J12
OD_SC: 20/25-1
OS_SC: 20/25-2

## 2020-12-01 ASSESSMENT — KERATOMETRY
OD_K2POWER_DIOPTERS: 42.25
OS_AXISANGLE2_DEGREES: 60
OD_K1POWER_DIOPTERS: 43
OD_AXISANGLE_DEGREES: 15
OS_K1POWER_DIOPTERS: 42.5
OS_AXISANGLE_DEGREES: 150
OS_K2POWER_DIOPTERS: 42
OD_AXISANGLE2_DEGREES: 105

## 2020-12-01 ASSESSMENT — TONOMETRY
OD_IOP_MMHG: 12
OS_IOP_MMHG: 12

## 2020-12-15 ENCOUNTER — POST-OP CATARACT (OUTPATIENT)
Dept: URBAN - METROPOLITAN AREA CLINIC 38 | Facility: CLINIC | Age: 70
End: 2020-12-15

## 2020-12-15 DIAGNOSIS — H11.32: ICD-10-CM

## 2020-12-15 DIAGNOSIS — H25.811: ICD-10-CM

## 2020-12-15 DIAGNOSIS — Z96.1: ICD-10-CM

## 2020-12-15 PROCEDURE — 99024 POSTOP FOLLOW-UP VISIT: CPT

## 2020-12-15 ASSESSMENT — VISUAL ACUITY
OD_SC: J10
OS_SC: 20/25
OD_SC: 20/20-2
OS_SC: J2

## 2020-12-15 ASSESSMENT — KERATOMETRY
OD_AXISANGLE_DEGREES: 15
OS_K2POWER_DIOPTERS: 42
OS_AXISANGLE2_DEGREES: 60
OD_K1POWER_DIOPTERS: 43
OD_K2POWER_DIOPTERS: 42.25
OD_AXISANGLE2_DEGREES: 105
OS_AXISANGLE_DEGREES: 150
OS_K1POWER_DIOPTERS: 42.5

## 2020-12-15 ASSESSMENT — TONOMETRY
OS_IOP_MMHG: 11
OD_IOP_MMHG: 10

## 2020-12-16 ENCOUNTER — FOLLOW UP (OUTPATIENT)
Dept: URBAN - METROPOLITAN AREA CLINIC 39 | Facility: CLINIC | Age: 70
End: 2020-12-16

## 2020-12-16 DIAGNOSIS — H25.811: ICD-10-CM

## 2020-12-16 DIAGNOSIS — H40.1112: ICD-10-CM

## 2020-12-16 DIAGNOSIS — H11.32: ICD-10-CM

## 2020-12-16 DIAGNOSIS — H40.1122: ICD-10-CM

## 2020-12-16 PROCEDURE — 92012 INTRM OPH EXAM EST PATIENT: CPT

## 2020-12-16 ASSESSMENT — KERATOMETRY
OS_AXISANGLE_DEGREES: 150
OS_K2POWER_DIOPTERS: 42
OD_AXISANGLE_DEGREES: 15
OS_AXISANGLE2_DEGREES: 60
OD_AXISANGLE2_DEGREES: 105
OD_K2POWER_DIOPTERS: 42.25
OD_K1POWER_DIOPTERS: 43
OS_K1POWER_DIOPTERS: 42.5

## 2020-12-16 ASSESSMENT — VISUAL ACUITY
OD_SC: 20/25-1
OS_SC: 20/25-2

## 2020-12-16 ASSESSMENT — TONOMETRY
OS_IOP_MMHG: 12
OD_IOP_MMHG: 13

## 2020-12-21 ENCOUNTER — FOLLOW UP (OUTPATIENT)
Dept: URBAN - METROPOLITAN AREA CLINIC 38 | Facility: CLINIC | Age: 70
End: 2020-12-21

## 2020-12-21 DIAGNOSIS — H11.32: ICD-10-CM

## 2020-12-21 DIAGNOSIS — H57.12: ICD-10-CM

## 2020-12-21 PROCEDURE — 99213 OFFICE O/P EST LOW 20 MIN: CPT

## 2020-12-21 ASSESSMENT — KERATOMETRY
OS_AXISANGLE_DEGREES: 150
OD_K1POWER_DIOPTERS: 43
OD_K2POWER_DIOPTERS: 42.25
OD_AXISANGLE_DEGREES: 15
OS_K2POWER_DIOPTERS: 42
OS_AXISANGLE2_DEGREES: 60
OS_K1POWER_DIOPTERS: 42.5
OD_AXISANGLE2_DEGREES: 105

## 2020-12-21 ASSESSMENT — VISUAL ACUITY
OD_CC: 20/20-2
OU_SC: 20/20-1
OS_CC: 20/25
OD_SC: J4
OS_SC: J4
OU_SC: J4
OU_CC: 20/20-1
OU_CC: J1
OD_CC: J1
OS_SC: 20/25
OS_CC: J1
OD_SC: 20/20

## 2020-12-21 ASSESSMENT — TONOMETRY: OS_IOP_MMHG: 11

## 2021-02-08 ENCOUNTER — IOP CHECK (OUTPATIENT)
Dept: URBAN - METROPOLITAN AREA CLINIC 39 | Facility: CLINIC | Age: 71
End: 2021-02-08

## 2021-02-08 DIAGNOSIS — H25.811: ICD-10-CM

## 2021-02-08 DIAGNOSIS — H11.32: ICD-10-CM

## 2021-02-08 DIAGNOSIS — H40.1122: ICD-10-CM

## 2021-02-08 DIAGNOSIS — H40.1112: ICD-10-CM

## 2021-02-08 PROCEDURE — 92012 INTRM OPH EXAM EST PATIENT: CPT

## 2021-02-08 PROCEDURE — 92083 EXTENDED VISUAL FIELD XM: CPT

## 2021-02-08 RX ORDER — MOXIFLOXACIN HYDROCHLORIDE 5 MG/ML: 1 SOLUTION/ DROPS OPHTHALMIC

## 2021-02-08 RX ORDER — CYCLOPENTOLATE HYDROCHLORIDE 10 MG/ML: 1 SOLUTION OPHTHALMIC TWICE A DAY

## 2021-02-08 RX ORDER — DUREZOL 0.5 MG/ML: 1 EMULSION OPHTHALMIC

## 2021-02-08 ASSESSMENT — KERATOMETRY
OS_K2POWER_DIOPTERS: 42
OD_K1POWER_DIOPTERS: 43
OS_K1POWER_DIOPTERS: 42.5
OS_AXISANGLE_DEGREES: 150
OD_AXISANGLE2_DEGREES: 105
OD_AXISANGLE_DEGREES: 15
OS_AXISANGLE2_DEGREES: 60
OD_K2POWER_DIOPTERS: 42.25

## 2021-02-08 ASSESSMENT — VISUAL ACUITY
OS_SC: 20/25+1
OD_SC: 20/20-1

## 2021-02-08 ASSESSMENT — TONOMETRY
OD_IOP_MMHG: 09
OS_IOP_MMHG: 10

## 2021-03-02 ENCOUNTER — EST. PATIENT EMERGENCY (OUTPATIENT)
Dept: URBAN - METROPOLITAN AREA CLINIC 38 | Facility: CLINIC | Age: 71
End: 2021-03-02

## 2021-03-02 DIAGNOSIS — H16.223: ICD-10-CM

## 2021-03-02 DIAGNOSIS — H11.32: ICD-10-CM

## 2021-03-02 DIAGNOSIS — H02.88A: ICD-10-CM

## 2021-03-02 DIAGNOSIS — H01.026: ICD-10-CM

## 2021-03-02 DIAGNOSIS — H01.023: ICD-10-CM

## 2021-03-02 DIAGNOSIS — H02.88B: ICD-10-CM

## 2021-03-02 PROCEDURE — 99212 OFFICE O/P EST SF 10 MIN: CPT

## 2021-03-02 RX ORDER — MOXIFLOXACIN OPHTHALMIC 5 MG/ML
1 SOLUTION/ DROPS OPHTHALMIC
Start: 2021-03-02

## 2021-03-02 ASSESSMENT — KERATOMETRY
OS_K2POWER_DIOPTERS: 42
OD_AXISANGLE2_DEGREES: 105
OD_K1POWER_DIOPTERS: 43
OS_AXISANGLE2_DEGREES: 60
OS_K1POWER_DIOPTERS: 42.5
OD_K2POWER_DIOPTERS: 42.25
OD_AXISANGLE_DEGREES: 15
OS_AXISANGLE_DEGREES: 150

## 2021-03-02 ASSESSMENT — VISUAL ACUITY
OS_SC: 20/20-2
OD_SC: 20/20

## 2021-03-02 ASSESSMENT — TONOMETRY
OD_IOP_MMHG: 12
OS_IOP_MMHG: 11

## 2021-03-08 ENCOUNTER — FOLLOW UP (OUTPATIENT)
Dept: URBAN - METROPOLITAN AREA CLINIC 38 | Facility: CLINIC | Age: 71
End: 2021-03-08

## 2021-03-08 DIAGNOSIS — H11.32: ICD-10-CM

## 2021-03-08 DIAGNOSIS — H01.026: ICD-10-CM

## 2021-03-08 DIAGNOSIS — H16.223: ICD-10-CM

## 2021-03-08 DIAGNOSIS — H01.023: ICD-10-CM

## 2021-03-08 DIAGNOSIS — H02.88B: ICD-10-CM

## 2021-03-08 DIAGNOSIS — H02.88A: ICD-10-CM

## 2021-03-08 PROCEDURE — 99212 OFFICE O/P EST SF 10 MIN: CPT

## 2021-03-08 ASSESSMENT — KERATOMETRY
OD_K1POWER_DIOPTERS: 43
OD_AXISANGLE2_DEGREES: 105
OS_AXISANGLE_DEGREES: 150
OD_AXISANGLE_DEGREES: 15
OS_K2POWER_DIOPTERS: 42
OD_K2POWER_DIOPTERS: 42.25
OS_K1POWER_DIOPTERS: 42.5
OS_AXISANGLE2_DEGREES: 60

## 2021-03-08 ASSESSMENT — TONOMETRY
OD_IOP_MMHG: 11
OS_IOP_MMHG: 13

## 2021-03-08 ASSESSMENT — VISUAL ACUITY
OS_SC: J2
OD_SC: J10
OU_SC: 20/20-2
OU_SC: J4
OS_SC: 20/20-2

## 2021-04-06 ENCOUNTER — EST. PATIENT EMERGENCY (OUTPATIENT)
Dept: URBAN - METROPOLITAN AREA CLINIC 38 | Facility: CLINIC | Age: 71
End: 2021-04-06

## 2021-04-06 DIAGNOSIS — H01.026: ICD-10-CM

## 2021-04-06 DIAGNOSIS — H11.32: ICD-10-CM

## 2021-04-06 DIAGNOSIS — H20.9: ICD-10-CM

## 2021-04-06 DIAGNOSIS — H01.023: ICD-10-CM

## 2021-04-06 PROCEDURE — 99212 OFFICE O/P EST SF 10 MIN: CPT

## 2021-04-06 RX ORDER — PREDNISOLONE ACETATE 10 MG/ML
1 SUSPENSION/ DROPS OPHTHALMIC
Start: 2021-04-06

## 2021-04-06 RX ORDER — BIMATOPROST 0.3 MG/ML: 1 SOLUTION/ DROPS OPHTHALMIC EVERY EVENING

## 2021-04-06 ASSESSMENT — KERATOMETRY
OD_AXISANGLE2_DEGREES: 105
OS_K1POWER_DIOPTERS: 42.5
OS_K2POWER_DIOPTERS: 42
OS_AXISANGLE_DEGREES: 150
OS_AXISANGLE2_DEGREES: 60
OD_K1POWER_DIOPTERS: 43
OD_K2POWER_DIOPTERS: 42.25
OD_AXISANGLE_DEGREES: 15

## 2021-04-06 ASSESSMENT — VISUAL ACUITY
OD_SC: 20/25
OU_SC: 20/20-1
OS_SC: 20/20-1

## 2021-04-06 ASSESSMENT — TONOMETRY
OS_IOP_MMHG: 13
OD_IOP_MMHG: 11

## 2021-04-13 ENCOUNTER — FOLLOW UP (OUTPATIENT)
Dept: URBAN - METROPOLITAN AREA CLINIC 38 | Facility: CLINIC | Age: 71
End: 2021-04-13

## 2021-04-13 DIAGNOSIS — H26.492: ICD-10-CM

## 2021-04-13 DIAGNOSIS — H11.32: ICD-10-CM

## 2021-04-13 DIAGNOSIS — Z96.1: ICD-10-CM

## 2021-04-13 DIAGNOSIS — H52.03: ICD-10-CM

## 2021-04-13 DIAGNOSIS — H20.9: ICD-10-CM

## 2021-04-13 PROCEDURE — 92015 DETERMINE REFRACTIVE STATE: CPT

## 2021-04-13 PROCEDURE — 99212 OFFICE O/P EST SF 10 MIN: CPT

## 2021-04-13 ASSESSMENT — KERATOMETRY
OD_K1POWER_DIOPTERS: 43
OS_K2POWER_DIOPTERS: 42
OS_AXISANGLE_DEGREES: 150
OS_AXISANGLE2_DEGREES: 60
OD_AXISANGLE2_DEGREES: 105
OD_K2POWER_DIOPTERS: 42.25
OS_K1POWER_DIOPTERS: 42.5
OD_AXISANGLE_DEGREES: 15

## 2021-04-13 ASSESSMENT — TONOMETRY
OD_IOP_MMHG: 13
OS_IOP_MMHG: 13

## 2021-04-13 ASSESSMENT — VISUAL ACUITY
OS_BAT: 20/20-1
OU_SC: 20/20-1
OS_SC: 20/20-2
OD_SC: 20/25

## 2021-04-30 ENCOUNTER — CONSULT (OUTPATIENT)
Dept: URBAN - METROPOLITAN AREA CLINIC 39 | Facility: CLINIC | Age: 71
End: 2021-04-30

## 2021-04-30 DIAGNOSIS — H40.1122: ICD-10-CM

## 2021-04-30 DIAGNOSIS — H35.371: ICD-10-CM

## 2021-04-30 DIAGNOSIS — H18.513: ICD-10-CM

## 2021-04-30 DIAGNOSIS — H26.492: ICD-10-CM

## 2021-04-30 DIAGNOSIS — H40.1112: ICD-10-CM

## 2021-04-30 DIAGNOSIS — H25.811: ICD-10-CM

## 2021-04-30 DIAGNOSIS — H11.32: ICD-10-CM

## 2021-04-30 DIAGNOSIS — H20.9: ICD-10-CM

## 2021-04-30 PROCEDURE — 92025 CPTRIZED CORNEAL TOPOGRAPHY: CPT

## 2021-04-30 PROCEDURE — 99213 OFFICE O/P EST LOW 20 MIN: CPT

## 2021-04-30 PROCEDURE — 92134 CPTRZ OPH DX IMG PST SGM RTA: CPT

## 2021-04-30 RX ORDER — PREDNISOLONE ACETATE 10 MG/ML: 1 SUSPENSION/ DROPS OPHTHALMIC

## 2021-04-30 ASSESSMENT — VISUAL ACUITY
OS_BAT: 20/20-1
OD_CC: 20/25+1
OD_CC: J1
OS_SC: 20/25
OD_SC: 20/50-2
OS_SC: J3
OD_SC: J10

## 2021-04-30 ASSESSMENT — TONOMETRY
OS_IOP_MMHG: 12
OD_IOP_MMHG: 11

## 2021-05-05 ENCOUNTER — DILATED FUNDUS EXAM (OUTPATIENT)
Dept: URBAN - METROPOLITAN AREA CLINIC 39 | Facility: CLINIC | Age: 71
End: 2021-05-05

## 2021-05-05 DIAGNOSIS — H20.9: ICD-10-CM

## 2021-05-05 DIAGNOSIS — H40.1112: ICD-10-CM

## 2021-05-05 DIAGNOSIS — H11.32: ICD-10-CM

## 2021-05-05 DIAGNOSIS — H40.1122: ICD-10-CM

## 2021-05-05 PROCEDURE — 92012 INTRM OPH EXAM EST PATIENT: CPT

## 2021-05-05 PROCEDURE — 92202 OPSCPY EXTND ON/MAC DRAW: CPT

## 2021-05-05 PROCEDURE — 92133 CPTRZD OPH DX IMG PST SGM ON: CPT

## 2021-05-05 ASSESSMENT — TONOMETRY
OS_IOP_MMHG: 16
OD_IOP_MMHG: 12

## 2021-05-05 ASSESSMENT — VISUAL ACUITY
OD_SC: 20/25+1
OS_SC: 20/20-1

## 2021-10-27 ENCOUNTER — ESTABLISHED COMPREHENSIVE EXAM (OUTPATIENT)
Dept: URBAN - METROPOLITAN AREA CLINIC 39 | Facility: CLINIC | Age: 71
End: 2021-10-27

## 2021-10-27 DIAGNOSIS — H40.1112: ICD-10-CM

## 2021-10-27 DIAGNOSIS — H40.1122: ICD-10-CM

## 2021-10-27 DIAGNOSIS — H11.32: ICD-10-CM

## 2021-10-27 DIAGNOSIS — H20.9: ICD-10-CM

## 2021-10-27 PROCEDURE — 92250 FUNDUS PHOTOGRAPHY W/I&R: CPT

## 2021-10-27 PROCEDURE — 92014 COMPRE OPH EXAM EST PT 1/>: CPT

## 2021-10-27 ASSESSMENT — VISUAL ACUITY
OS_SC: J3
OS_SC: 20/30-1
OD_SC: J12

## 2021-10-27 ASSESSMENT — TONOMETRY
OS_IOP_MMHG: 11
OD_IOP_MMHG: 12

## 2021-11-01 ENCOUNTER — TECH ONLY (OUTPATIENT)
Dept: URBAN - METROPOLITAN AREA CLINIC 39 | Facility: CLINIC | Age: 71
End: 2021-11-01

## 2021-11-01 DIAGNOSIS — H40.1122: ICD-10-CM

## 2021-11-01 DIAGNOSIS — H40.1112: ICD-10-CM

## 2021-11-01 DIAGNOSIS — H11.32: ICD-10-CM

## 2021-11-01 DIAGNOSIS — H20.9: ICD-10-CM

## 2021-11-01 PROCEDURE — 99211T TECH SERVICE

## 2021-11-01 PROCEDURE — 92083 EXTENDED VISUAL FIELD XM: CPT

## 2021-11-18 ENCOUNTER — EST. PATIENT EMERGENCY (OUTPATIENT)
Dept: URBAN - METROPOLITAN AREA CLINIC 38 | Facility: CLINIC | Age: 71
End: 2021-11-18

## 2021-11-18 DIAGNOSIS — H16.223: ICD-10-CM

## 2021-11-18 DIAGNOSIS — H01.023: ICD-10-CM

## 2021-11-18 DIAGNOSIS — H01.026: ICD-10-CM

## 2021-11-18 PROCEDURE — 99212 OFFICE O/P EST SF 10 MIN: CPT

## 2021-11-18 ASSESSMENT — VISUAL ACUITY
OD_SC: 20/25-2
OU_SC: 20/20
OS_SC: 20/20

## 2021-11-18 ASSESSMENT — TONOMETRY
OD_IOP_MMHG: 10
OS_IOP_MMHG: 10

## 2021-12-15 ENCOUNTER — FOLLOW UP (OUTPATIENT)
Dept: URBAN - METROPOLITAN AREA CLINIC 39 | Facility: CLINIC | Age: 71
End: 2021-12-15

## 2021-12-15 DIAGNOSIS — H20.9: ICD-10-CM

## 2021-12-15 DIAGNOSIS — H40.1133: ICD-10-CM

## 2021-12-15 DIAGNOSIS — H11.32: ICD-10-CM

## 2021-12-15 DIAGNOSIS — H25.811: ICD-10-CM

## 2021-12-15 DIAGNOSIS — H26.492: ICD-10-CM

## 2021-12-15 PROCEDURE — 92012 INTRM OPH EXAM EST PATIENT: CPT

## 2021-12-15 RX ORDER — ATROPINE SULFATE 10 MG/ML: 1 SOLUTION/ DROPS OPHTHALMIC TWICE A DAY

## 2021-12-15 RX ORDER — MOXIFLOXACIN OPHTHALMIC 5 MG/ML: 1 SOLUTION/ DROPS OPHTHALMIC

## 2021-12-15 RX ORDER — DUREZOL 0.5 MG/ML: 1 EMULSION OPHTHALMIC

## 2021-12-15 ASSESSMENT — TONOMETRY
OD_IOP_MMHG: 10
OS_IOP_MMHG: 05

## 2021-12-15 ASSESSMENT — VISUAL ACUITY
OD_SC: 20/25
OS_SC: 20/25

## 2021-12-22 ENCOUNTER — EMERGENCY VISIT (OUTPATIENT)
Dept: URBAN - METROPOLITAN AREA CLINIC 38 | Facility: CLINIC | Age: 71
End: 2021-12-22

## 2021-12-22 DIAGNOSIS — H11.32: ICD-10-CM

## 2021-12-22 DIAGNOSIS — H40.1133: ICD-10-CM

## 2021-12-22 DIAGNOSIS — H01.02B: ICD-10-CM

## 2021-12-22 DIAGNOSIS — H16.223: ICD-10-CM

## 2021-12-22 DIAGNOSIS — H01.02A: ICD-10-CM

## 2021-12-22 PROCEDURE — 92012 INTRM OPH EXAM EST PATIENT: CPT

## 2021-12-22 ASSESSMENT — TONOMETRY
OD_IOP_MMHG: 10
OS_IOP_MMHG: 11
OD_IOP_MMHG: 12
OS_IOP_MMHG: 10

## 2021-12-22 ASSESSMENT — VISUAL ACUITY
OS_SC: 20/20-1
OU_SC: 20/20-2
OD_SC: 20/20-1

## 2021-12-30 ENCOUNTER — EMERGENCY VISIT (OUTPATIENT)
Dept: URBAN - METROPOLITAN AREA CLINIC 38 | Facility: CLINIC | Age: 71
End: 2021-12-30

## 2021-12-30 DIAGNOSIS — H02.88A: ICD-10-CM

## 2021-12-30 DIAGNOSIS — H11.32: ICD-10-CM

## 2021-12-30 DIAGNOSIS — H16.223: ICD-10-CM

## 2021-12-30 DIAGNOSIS — H02.88B: ICD-10-CM

## 2021-12-30 PROCEDURE — 99212 OFFICE O/P EST SF 10 MIN: CPT

## 2021-12-30 ASSESSMENT — TONOMETRY
OS_IOP_MMHG: 12
OD_IOP_MMHG: 10

## 2021-12-30 ASSESSMENT — VISUAL ACUITY
OS_SC: 20/20-1
OU_SC: 20/20-2
OD_SC: 20/20

## 2022-01-04 ENCOUNTER — SURGERY/PROCEDURE (OUTPATIENT)
Dept: URBAN - METROPOLITAN AREA CLINIC 39 | Facility: CLINIC | Age: 72
End: 2022-01-04

## 2022-01-04 ENCOUNTER — PRE-OP/H&P (OUTPATIENT)
Dept: URBAN - METROPOLITAN AREA SURGERY 14 | Facility: SURGERY | Age: 72
End: 2022-01-04

## 2022-01-04 DIAGNOSIS — H40.1133: ICD-10-CM

## 2022-01-04 DIAGNOSIS — H11.32: ICD-10-CM

## 2022-01-04 DIAGNOSIS — H40.1113: ICD-10-CM

## 2022-01-04 PROCEDURE — 66170 GLAUCOMA SURGERY: CPT

## 2022-01-04 PROCEDURE — 99211HP H&P OFFICE/OUTPATIENT VISIT, EST

## 2022-01-05 ENCOUNTER — POST-OP (OUTPATIENT)
Dept: URBAN - METROPOLITAN AREA CLINIC 39 | Facility: CLINIC | Age: 72
End: 2022-01-05

## 2022-01-05 DIAGNOSIS — H11.32: ICD-10-CM

## 2022-01-05 DIAGNOSIS — H40.1133: ICD-10-CM

## 2022-01-05 PROCEDURE — 66999PO NON CO-MANAGED OTHER SURGERY PO

## 2022-01-05 RX ORDER — BRIMONIDINE TARTRATE 1 MG/ML
1 SOLUTION/ DROPS OPHTHALMIC TWICE A DAY
Start: 2022-01-05

## 2022-01-05 ASSESSMENT — TONOMETRY
OS_IOP_MMHG: 15
OD_IOP_MMHG: 19

## 2022-01-05 ASSESSMENT — VISUAL ACUITY
OD_SC: 20/25+2
OS_SC: 20/25

## 2022-01-12 ENCOUNTER — POST-OP (OUTPATIENT)
Dept: URBAN - METROPOLITAN AREA CLINIC 39 | Facility: CLINIC | Age: 72
End: 2022-01-12

## 2022-01-12 DIAGNOSIS — H40.1133: ICD-10-CM

## 2022-01-12 DIAGNOSIS — H11.32: ICD-10-CM

## 2022-01-12 PROCEDURE — 66999PO NON CO-MANAGED OTHER SURGERY PO

## 2022-01-12 RX ORDER — MOXIFLOXACIN OPHTHALMIC 5 MG/ML: 1 SOLUTION/ DROPS OPHTHALMIC

## 2022-01-12 ASSESSMENT — VISUAL ACUITY
OS_SC: 20/30
OD_SC: 20/30-2

## 2022-01-12 ASSESSMENT — TONOMETRY
OS_IOP_MMHG: 12
OD_IOP_MMHG: 22

## 2022-01-14 ENCOUNTER — EMERGENCY VISIT (OUTPATIENT)
Dept: URBAN - METROPOLITAN AREA CLINIC 38 | Facility: CLINIC | Age: 72
End: 2022-01-14

## 2022-01-14 DIAGNOSIS — H40.1133: ICD-10-CM

## 2022-01-14 DIAGNOSIS — H11.32: ICD-10-CM

## 2022-01-14 PROCEDURE — 66999PO NON CO-MANAGED OTHER SURGERY PO

## 2022-01-14 ASSESSMENT — VISUAL ACUITY
OD_SC: 20/30
OS_SC: 20/20-2

## 2022-01-21 ENCOUNTER — EMERGENCY VISIT (OUTPATIENT)
Dept: URBAN - METROPOLITAN AREA CLINIC 39 | Facility: CLINIC | Age: 72
End: 2022-01-21

## 2022-01-21 DIAGNOSIS — H11.32: ICD-10-CM

## 2022-01-21 DIAGNOSIS — H40.1133: ICD-10-CM

## 2022-01-21 PROCEDURE — 99024 POSTOP FOLLOW-UP VISIT: CPT

## 2022-01-21 ASSESSMENT — VISUAL ACUITY
OS_CC: J1+
OD_SC: 20/40+1
OS_SC: 20/25-1
OD_CC: J1

## 2022-01-21 ASSESSMENT — TONOMETRY: OD_IOP_MMHG: 18

## 2022-01-24 ENCOUNTER — EMERGENCY VISIT (OUTPATIENT)
Dept: URBAN - METROPOLITAN AREA CLINIC 39 | Facility: CLINIC | Age: 72
End: 2022-01-24

## 2022-01-24 DIAGNOSIS — H11.32: ICD-10-CM

## 2022-01-24 DIAGNOSIS — H40.1133: ICD-10-CM

## 2022-01-24 PROCEDURE — 99024 POSTOP FOLLOW-UP VISIT: CPT

## 2022-01-24 ASSESSMENT — VISUAL ACUITY
OU_SC: 20/30-2
OD_SC: 20/30-1
OS_SC: 20/30-2

## 2022-01-25 ENCOUNTER — POST-OP (OUTPATIENT)
Dept: URBAN - METROPOLITAN AREA CLINIC 43 | Facility: CLINIC | Age: 72
End: 2022-01-25

## 2022-01-25 DIAGNOSIS — H11.32: ICD-10-CM

## 2022-01-25 DIAGNOSIS — H40.1133: ICD-10-CM

## 2022-01-25 PROCEDURE — 66999PO NON CO-MANAGED OTHER SURGERY PO

## 2022-01-25 ASSESSMENT — VISUAL ACUITY
OD_SC: 20/30-2
OS_SC: 20/30-2

## 2022-01-25 ASSESSMENT — TONOMETRY: OD_IOP_MMHG: 22

## 2022-02-02 ENCOUNTER — POST-OP (OUTPATIENT)
Dept: URBAN - METROPOLITAN AREA CLINIC 39 | Facility: CLINIC | Age: 72
End: 2022-02-02

## 2022-02-02 DIAGNOSIS — H11.32: ICD-10-CM

## 2022-02-02 DIAGNOSIS — H40.1133: ICD-10-CM

## 2022-02-02 PROCEDURE — 66999PO NON CO-MANAGED OTHER SURGERY PO

## 2022-02-02 ASSESSMENT — TONOMETRY
OD_IOP_MMHG: 08
OD_IOP_MMHG: 18

## 2022-02-02 ASSESSMENT — VISUAL ACUITY: OS_SC: 20/40-2

## 2022-02-16 ENCOUNTER — POST-OP (OUTPATIENT)
Dept: URBAN - METROPOLITAN AREA CLINIC 39 | Facility: CLINIC | Age: 72
End: 2022-02-16

## 2022-02-16 DIAGNOSIS — H40.1133: ICD-10-CM

## 2022-02-16 DIAGNOSIS — H11.32: ICD-10-CM

## 2022-02-16 PROCEDURE — 66999PO NON CO-MANAGED OTHER SURGERY PO

## 2022-02-16 ASSESSMENT — TONOMETRY: OS_IOP_MMHG: 14

## 2022-02-16 ASSESSMENT — VISUAL ACUITY: OS_SC: 20/25

## 2022-03-02 ENCOUNTER — POST-OP (OUTPATIENT)
Dept: URBAN - METROPOLITAN AREA CLINIC 39 | Facility: CLINIC | Age: 72
End: 2022-03-02

## 2022-03-02 DIAGNOSIS — H40.1133: ICD-10-CM

## 2022-03-02 DIAGNOSIS — H11.32: ICD-10-CM

## 2022-03-02 PROCEDURE — 66999PO NON CO-MANAGED OTHER SURGERY PO

## 2022-03-02 ASSESSMENT — VISUAL ACUITY: OS_SC: 20/25-1

## 2022-03-02 ASSESSMENT — TONOMETRY
OD_IOP_MMHG: 08
OD_IOP_MMHG: 14
OS_IOP_MMHG: 15

## 2022-03-14 ENCOUNTER — EMERGENCY VISIT (OUTPATIENT)
Dept: URBAN - METROPOLITAN AREA CLINIC 38 | Facility: CLINIC | Age: 72
End: 2022-03-14

## 2022-03-14 DIAGNOSIS — H40.1133: ICD-10-CM

## 2022-03-14 DIAGNOSIS — H01.026: ICD-10-CM

## 2022-03-14 DIAGNOSIS — H11.32: ICD-10-CM

## 2022-03-14 DIAGNOSIS — H16.223: ICD-10-CM

## 2022-03-14 DIAGNOSIS — H01.023: ICD-10-CM

## 2022-03-14 PROCEDURE — 92012 INTRM OPH EXAM EST PATIENT: CPT

## 2022-03-14 ASSESSMENT — TONOMETRY
OS_IOP_MMHG: 16
OS_IOP_MMHG: 11
OD_IOP_MMHG: 10
OD_IOP_MMHG: 11

## 2022-03-14 ASSESSMENT — VISUAL ACUITY
OD_SC: 20/20-2
OS_SC: 20/20

## 2022-03-23 ENCOUNTER — POST-OP (OUTPATIENT)
Dept: URBAN - METROPOLITAN AREA CLINIC 39 | Facility: CLINIC | Age: 72
End: 2022-03-23

## 2022-03-23 DIAGNOSIS — H11.32: ICD-10-CM

## 2022-03-23 DIAGNOSIS — H16.223: ICD-10-CM

## 2022-03-23 DIAGNOSIS — H01.02A: ICD-10-CM

## 2022-03-23 DIAGNOSIS — H01.02B: ICD-10-CM

## 2022-03-23 DIAGNOSIS — H40.1133: ICD-10-CM

## 2022-03-23 PROCEDURE — 66999PO NON CO-MANAGED OTHER SURGERY PO

## 2022-03-23 RX ORDER — ATROPINE SULFATE 10 MG/ML: 1 SOLUTION/ DROPS OPHTHALMIC ONCE A DAY

## 2022-03-23 ASSESSMENT — TONOMETRY
OD_IOP_MMHG: 09
OD_IOP_MMHG: 15
OS_IOP_MMHG: 14

## 2022-03-23 ASSESSMENT — VISUAL ACUITY
OD_SC: 20/25-2
OS_SC: 20/30-2

## 2022-04-05 ENCOUNTER — POST-OP (OUTPATIENT)
Dept: URBAN - METROPOLITAN AREA CLINIC 39 | Facility: CLINIC | Age: 72
End: 2022-04-05

## 2022-04-05 DIAGNOSIS — H11.32: ICD-10-CM

## 2022-04-05 DIAGNOSIS — H40.1133: ICD-10-CM

## 2022-04-05 PROCEDURE — 66999PO NON CO-MANAGED OTHER SURGERY PO

## 2022-04-05 ASSESSMENT — TONOMETRY
OS_IOP_MMHG: 13
OD_IOP_MMHG: 02

## 2022-04-05 ASSESSMENT — VISUAL ACUITY
OD_SC: 20/40-1
OS_SC: 20/25
OD_PH: 20/25+1

## 2022-04-07 ENCOUNTER — EMERGENCY VISIT (OUTPATIENT)
Dept: URBAN - METROPOLITAN AREA CLINIC 38 | Facility: CLINIC | Age: 72
End: 2022-04-07

## 2022-04-07 DIAGNOSIS — T15.11XA: ICD-10-CM

## 2022-04-07 DIAGNOSIS — H40.1133: ICD-10-CM

## 2022-04-07 PROCEDURE — 92012 INTRM OPH EXAM EST PATIENT: CPT

## 2022-04-07 PROCEDURE — 65205 REMOVE FOREIGN BODY FROM EYE: CPT

## 2022-04-07 ASSESSMENT — TONOMETRY
OD_IOP_MMHG: 06
OD_IOP_MMHG: 04
OS_IOP_MMHG: 14

## 2022-04-07 ASSESSMENT — VISUAL ACUITY
OD_PH: 20/20-2
OD_SC: 20/50-2
OS_SC: 20/20-2

## 2022-04-15 ENCOUNTER — EMERGENCY VISIT (OUTPATIENT)
Dept: URBAN - METROPOLITAN AREA CLINIC 38 | Facility: CLINIC | Age: 72
End: 2022-04-15

## 2022-04-15 DIAGNOSIS — H11.32: ICD-10-CM

## 2022-04-15 DIAGNOSIS — H11.31: ICD-10-CM

## 2022-04-15 DIAGNOSIS — H16.223: ICD-10-CM

## 2022-04-15 PROCEDURE — 92012 INTRM OPH EXAM EST PATIENT: CPT

## 2022-04-15 ASSESSMENT — VISUAL ACUITY
OD_PH: 20/25
OD_SC: 20/40-1
OS_SC: 20/25

## 2022-04-15 ASSESSMENT — TONOMETRY
OS_IOP_MMHG: 16
OD_IOP_MMHG: 02
OD_IOP_MMHG: 04

## 2022-04-26 ENCOUNTER — FOLLOW UP (OUTPATIENT)
Dept: URBAN - METROPOLITAN AREA CLINIC 39 | Facility: CLINIC | Age: 72
End: 2022-04-26

## 2022-04-26 DIAGNOSIS — H40.1133: ICD-10-CM

## 2022-04-26 DIAGNOSIS — H11.32: ICD-10-CM

## 2022-04-26 DIAGNOSIS — H18.513: ICD-10-CM

## 2022-04-26 DIAGNOSIS — Z96.1: ICD-10-CM

## 2022-04-26 DIAGNOSIS — H25.811: ICD-10-CM

## 2022-04-26 PROCEDURE — 92012 INTRM OPH EXAM EST PATIENT: CPT

## 2022-04-26 ASSESSMENT — TONOMETRY
OD_IOP_MMHG: 02
OS_IOP_MMHG: 12

## 2022-04-26 ASSESSMENT — VISUAL ACUITY
OD_SC: 20/40-2
OS_SC: 20/30+1

## 2022-05-10 ENCOUNTER — FOLLOW UP (OUTPATIENT)
Dept: URBAN - METROPOLITAN AREA CLINIC 39 | Facility: CLINIC | Age: 72
End: 2022-05-10

## 2022-05-10 DIAGNOSIS — H40.1133: ICD-10-CM

## 2022-05-10 DIAGNOSIS — H25.811: ICD-10-CM

## 2022-05-10 DIAGNOSIS — H11.32: ICD-10-CM

## 2022-05-10 DIAGNOSIS — H16.223: ICD-10-CM

## 2022-05-10 DIAGNOSIS — H26.492: ICD-10-CM

## 2022-05-10 PROCEDURE — 92012 INTRM OPH EXAM EST PATIENT: CPT

## 2022-05-10 ASSESSMENT — TONOMETRY
OD_IOP_MMHG: 06
OS_IOP_MMHG: 10

## 2022-05-10 ASSESSMENT — VISUAL ACUITY
OD_SC: 20/40-2
OS_SC: 20/25

## 2022-10-25 ENCOUNTER — FOLLOW UP (OUTPATIENT)
Dept: URBAN - METROPOLITAN AREA CLINIC 39 | Facility: CLINIC | Age: 72
End: 2022-10-25

## 2022-10-25 DIAGNOSIS — H11.32: ICD-10-CM

## 2022-10-25 DIAGNOSIS — H40.1133: ICD-10-CM

## 2022-10-25 PROCEDURE — 92202 OPSCPY EXTND ON/MAC DRAW: CPT

## 2022-10-25 PROCEDURE — 92133 CPTRZD OPH DX IMG PST SGM ON: CPT

## 2022-10-25 PROCEDURE — 92012 INTRM OPH EXAM EST PATIENT: CPT

## 2022-10-25 ASSESSMENT — VISUAL ACUITY
OD_CC: 20/30-1
OS_CC: 20/25-2

## 2022-10-25 ASSESSMENT — TONOMETRY
OS_IOP_MMHG: 07
OD_IOP_MMHG: 03

## 2022-11-02 ENCOUNTER — CONSULTATION/EVALUATION (OUTPATIENT)
Dept: URBAN - METROPOLITAN AREA CLINIC 39 | Facility: CLINIC | Age: 72
End: 2022-11-02

## 2022-11-02 DIAGNOSIS — H16.223: ICD-10-CM

## 2022-11-02 DIAGNOSIS — H35.371: ICD-10-CM

## 2022-11-02 DIAGNOSIS — H18.513: ICD-10-CM

## 2022-11-02 DIAGNOSIS — H52.03: ICD-10-CM

## 2022-11-02 DIAGNOSIS — H11.32: ICD-10-CM

## 2022-11-02 DIAGNOSIS — H25.811: ICD-10-CM

## 2022-11-02 DIAGNOSIS — H21.541: ICD-10-CM

## 2022-11-02 DIAGNOSIS — H40.1133: ICD-10-CM

## 2022-11-02 DIAGNOSIS — T15.11XA: ICD-10-CM

## 2022-11-02 PROCEDURE — 92014 COMPRE OPH EXAM EST PT 1/>: CPT

## 2022-11-02 PROCEDURE — 92136TC INTERFEROMETRY - TECHNICAL COMPONENT

## 2022-11-02 PROCEDURE — 92015 DETERMINE REFRACTIVE STATE: CPT

## 2022-11-02 PROCEDURE — V2799PMN IMPRIMIS PRED-MOXI-NEPAF 5ML

## 2022-11-02 PROCEDURE — 92025CV CORNEAL TOPOGRAPHY, CV

## 2022-11-02 PROCEDURE — 92250 FUNDUS PHOTOGRAPHY W/I&R: CPT

## 2022-11-02 PROCEDURE — 92134 CPTRZ OPH DX IMG PST SGM RTA: CPT

## 2022-11-02 PROCEDURE — 92286 ANT SGM IMG I&R SPECLR MIC: CPT

## 2022-11-02 ASSESSMENT — VISUAL ACUITY
OS_CC: 20/20
OD_RAM: 20/20-1
OD_CC: 20/30
OD_BAT: <20/400
OS_BAT: 20/30
OD_CC: J2
OS_SC: 20/20-1
OD_SC: 20/30
OS_CC: J1
OS_SC: J1
OD_SC: J3

## 2022-11-02 ASSESSMENT — TONOMETRY
OD_IOP_MMHG: 05
OS_IOP_MMHG: 11

## 2022-11-03 ENCOUNTER — EMERGENCY VISIT (OUTPATIENT)
Dept: URBAN - METROPOLITAN AREA CLINIC 39 | Facility: CLINIC | Age: 72
End: 2022-11-03

## 2022-11-03 DIAGNOSIS — H11.32: ICD-10-CM

## 2022-11-03 DIAGNOSIS — H16.223: ICD-10-CM

## 2022-11-03 DIAGNOSIS — H21.541: ICD-10-CM

## 2022-11-03 DIAGNOSIS — H40.1133: ICD-10-CM

## 2022-11-03 DIAGNOSIS — H25.811: ICD-10-CM

## 2022-11-03 PROCEDURE — 92012 INTRM OPH EXAM EST PATIENT: CPT

## 2022-11-03 ASSESSMENT — VISUAL ACUITY
OS_SC: 20/30
OD_SC: 20/40+1

## 2022-11-08 ENCOUNTER — SURGERY/PROCEDURE (OUTPATIENT)
Dept: URBAN - METROPOLITAN AREA CLINIC 39 | Facility: CLINIC | Age: 72
End: 2022-11-08

## 2022-11-08 DIAGNOSIS — H21.541: ICD-10-CM

## 2022-11-08 DIAGNOSIS — H40.1133: ICD-10-CM

## 2022-11-08 DIAGNOSIS — H25.811: ICD-10-CM

## 2022-11-08 DIAGNOSIS — H27.8: ICD-10-CM

## 2022-11-08 PROCEDURE — 65772LRI LRI DURING CAT SX

## 2022-11-08 PROCEDURE — 65875 INCISE INNER EYE ADHESIONS: CPT

## 2022-11-08 PROCEDURE — 66984CV REMOVE CATARACT, INSERT LENS, CUSTOM VISION

## 2022-11-09 ENCOUNTER — POST-OP (OUTPATIENT)
Dept: URBAN - METROPOLITAN AREA CLINIC 38 | Facility: CLINIC | Age: 72
End: 2022-11-09

## 2022-11-09 DIAGNOSIS — H16.223: ICD-10-CM

## 2022-11-09 DIAGNOSIS — Z96.1: ICD-10-CM

## 2022-11-09 DIAGNOSIS — H11.32: ICD-10-CM

## 2022-11-09 PROCEDURE — 99024 POSTOP FOLLOW-UP VISIT: CPT

## 2022-11-09 ASSESSMENT — VISUAL ACUITY
OS_SC: J2
OD_SC: J4
OS_SC: 20/25-1
OU_SC: 20/25
OU_SC: J2
OD_SC: 20/50-1

## 2022-11-09 ASSESSMENT — TONOMETRY
OS_IOP_MMHG: 12
OD_IOP_MMHG: 05

## 2022-11-18 ENCOUNTER — POST-OP (OUTPATIENT)
Dept: URBAN - METROPOLITAN AREA CLINIC 38 | Facility: CLINIC | Age: 72
End: 2022-11-18

## 2022-11-18 DIAGNOSIS — Z96.1: ICD-10-CM

## 2022-11-18 DIAGNOSIS — H11.32: ICD-10-CM

## 2022-11-18 DIAGNOSIS — H16.223: ICD-10-CM

## 2022-11-18 PROCEDURE — 99024 POSTOP FOLLOW-UP VISIT: CPT

## 2022-11-18 ASSESSMENT — VISUAL ACUITY
OS_SC: J3
OD_SC: J1
OS_SC: 20/25+2
OD_SC: 20/20-2

## 2022-11-18 ASSESSMENT — TONOMETRY
OD_IOP_MMHG: 10
OS_IOP_MMHG: 12

## 2022-12-01 ENCOUNTER — POST-OP (OUTPATIENT)
Dept: URBAN - METROPOLITAN AREA CLINIC 38 | Facility: CLINIC | Age: 72
End: 2022-12-01

## 2022-12-01 PROCEDURE — 99024 POSTOP FOLLOW-UP VISIT: CPT

## 2022-12-01 PROCEDURE — 92015 DETERMINE REFRACTIVE STATE: CPT

## 2022-12-01 ASSESSMENT — VISUAL ACUITY
OD_SC: 20/20-2
OS_SC: J10
OS_SC: 20/25+2
OD_SC: J1

## 2022-12-01 ASSESSMENT — TONOMETRY
OD_IOP_MMHG: 11
OS_IOP_MMHG: 12

## 2022-12-21 ENCOUNTER — FOLLOW UP (OUTPATIENT)
Dept: URBAN - METROPOLITAN AREA CLINIC 39 | Facility: CLINIC | Age: 72
End: 2022-12-21

## 2022-12-21 PROCEDURE — 92012 INTRM OPH EXAM EST PATIENT: CPT

## 2022-12-21 RX ORDER — MINERAL OIL, PETROLATUM 425; 568 MG/G; MG/G: 1 OINTMENT OPHTHALMIC EVERY EVENING

## 2022-12-21 ASSESSMENT — VISUAL ACUITY
OD_SC: 20/25-1
OS_SC: 20/25-1

## 2022-12-21 ASSESSMENT — TONOMETRY
OS_IOP_MMHG: 10
OD_IOP_MMHG: 10

## 2023-04-26 ENCOUNTER — COMPREHENSIVE EXAM (OUTPATIENT)
Dept: URBAN - METROPOLITAN AREA CLINIC 39 | Facility: CLINIC | Age: 73
End: 2023-04-26

## 2023-04-26 DIAGNOSIS — H11.32: ICD-10-CM

## 2023-04-26 DIAGNOSIS — T15.11XA: ICD-10-CM

## 2023-04-26 DIAGNOSIS — H02.402: ICD-10-CM

## 2023-04-26 DIAGNOSIS — H16.223: ICD-10-CM

## 2023-04-26 DIAGNOSIS — H40.1133: ICD-10-CM

## 2023-04-26 PROCEDURE — 92250 FUNDUS PHOTOGRAPHY W/I&R: CPT

## 2023-04-26 PROCEDURE — 92012 INTRM OPH EXAM EST PATIENT: CPT

## 2023-04-26 RX ORDER — LATANOPROST 50 UG/ML: 1 SOLUTION/ DROPS OPHTHALMIC EVERY EVENING

## 2023-04-26 ASSESSMENT — VISUAL ACUITY
OD_SC: 20/25
OS_CC: 20/20
OS_SC: J2
OS_CC: J1
OD_CC: 20/20
OD_CC: J1
OD_SC: J2
OS_SC: 20/20

## 2023-04-26 ASSESSMENT — TONOMETRY
OD_IOP_MMHG: 11
OS_IOP_MMHG: 08

## 2023-10-25 ENCOUNTER — ESTABLISHED PATIENT (OUTPATIENT)
Dept: URBAN - METROPOLITAN AREA CLINIC 39 | Facility: CLINIC | Age: 73
End: 2023-10-25

## 2023-10-25 DIAGNOSIS — H40.1133: ICD-10-CM

## 2023-10-25 DIAGNOSIS — Z96.1: ICD-10-CM

## 2023-10-25 PROCEDURE — 92012 INTRM OPH EXAM EST PATIENT: CPT

## 2023-10-25 PROCEDURE — 92015 DETERMINE REFRACTIVE STATE: CPT

## 2023-10-25 PROCEDURE — 92133 CPTRZD OPH DX IMG PST SGM ON: CPT

## 2023-10-25 PROCEDURE — 92202 OPSCPY EXTND ON/MAC DRAW: CPT

## 2023-10-25 ASSESSMENT — VISUAL ACUITY
OD_SC: 20/20
OS_SC: J4
OD_SC: J2
OS_SC: 20/20-1

## 2023-10-25 ASSESSMENT — TONOMETRY
OD_IOP_MMHG: 10
OS_IOP_MMHG: 09

## 2023-12-27 ENCOUNTER — EMERGENCY VISIT (OUTPATIENT)
Dept: URBAN - METROPOLITAN AREA CLINIC 38 | Facility: CLINIC | Age: 73
End: 2023-12-27

## 2023-12-27 DIAGNOSIS — H10.022: ICD-10-CM

## 2023-12-27 DIAGNOSIS — H40.1133: ICD-10-CM

## 2023-12-27 PROCEDURE — 99214 OFFICE O/P EST MOD 30 MIN: CPT

## 2023-12-27 RX ORDER — TOBRAMYCIN 3 MG/ML: 1 SOLUTION/ DROPS OPHTHALMIC

## 2023-12-27 ASSESSMENT — VISUAL ACUITY
OS_SC: 20/20-1
OU_SC: 20/20
OD_SC: 20/20-2

## 2023-12-27 ASSESSMENT — TONOMETRY
OD_IOP_MMHG: 12
OS_IOP_MMHG: 12

## 2024-01-17 ENCOUNTER — EMERGENCY VISIT (OUTPATIENT)
Dept: URBAN - METROPOLITAN AREA CLINIC 35 | Facility: CLINIC | Age: 74
End: 2024-01-17

## 2024-01-17 DIAGNOSIS — S00.12XA: ICD-10-CM

## 2024-01-17 DIAGNOSIS — H04.123: ICD-10-CM

## 2024-01-17 PROCEDURE — 99213 OFFICE O/P EST LOW 20 MIN: CPT

## 2024-01-17 ASSESSMENT — VISUAL ACUITY
OD_SC: 20/25+2
OS_SC: 20/30+2

## 2024-01-30 ENCOUNTER — FOLLOW UP (OUTPATIENT)
Dept: URBAN - METROPOLITAN AREA CLINIC 39 | Facility: CLINIC | Age: 74
End: 2024-01-30

## 2024-01-30 DIAGNOSIS — H40.1133: ICD-10-CM

## 2024-01-30 PROCEDURE — 92083 EXTENDED VISUAL FIELD XM: CPT

## 2024-01-30 PROCEDURE — 92012 INTRM OPH EXAM EST PATIENT: CPT

## 2024-01-30 ASSESSMENT — TONOMETRY
OD_IOP_MMHG: 10
OS_IOP_MMHG: 09

## 2024-01-30 ASSESSMENT — VISUAL ACUITY
OS_SC: 20/25-1
OD_SC: 20/25-1

## 2024-02-29 ENCOUNTER — EMERGENCY VISIT (OUTPATIENT)
Dept: URBAN - METROPOLITAN AREA CLINIC 38 | Facility: CLINIC | Age: 74
End: 2024-02-29

## 2024-02-29 DIAGNOSIS — H40.1133: ICD-10-CM

## 2024-02-29 DIAGNOSIS — H16.223: ICD-10-CM

## 2024-02-29 PROCEDURE — 99214 OFFICE O/P EST MOD 30 MIN: CPT

## 2024-02-29 RX ORDER — ERYTHROMYCIN 5 MG/G: OINTMENT OPHTHALMIC EVERY EVENING

## 2024-02-29 ASSESSMENT — TONOMETRY
OS_IOP_MMHG: 14
OD_IOP_MMHG: 14

## 2024-02-29 ASSESSMENT — VISUAL ACUITY
OS_SC: 20/25
OU_SC: 20/20-1
OD_SC: 20/20-1

## 2024-04-23 ENCOUNTER — COMPREHENSIVE EXAM (OUTPATIENT)
Dept: URBAN - METROPOLITAN AREA CLINIC 39 | Facility: CLINIC | Age: 74
End: 2024-04-23

## 2024-04-23 DIAGNOSIS — H40.1133: ICD-10-CM

## 2024-04-23 DIAGNOSIS — H16.223: ICD-10-CM

## 2024-04-23 PROCEDURE — 92250 FUNDUS PHOTOGRAPHY W/I&R: CPT

## 2024-04-23 PROCEDURE — 92014 COMPRE OPH EXAM EST PT 1/>: CPT

## 2024-04-23 ASSESSMENT — VISUAL ACUITY
OD_CC: J1
OS_CC: J2
OD_SC: J4
OD_SC: 20/20
OS_SC: 20/20
OS_SC: J4

## 2024-04-23 ASSESSMENT — TONOMETRY
OD_IOP_MMHG: 10
OS_IOP_MMHG: 10

## 2024-12-11 ENCOUNTER — FOLLOW UP (OUTPATIENT)
Age: 74
End: 2024-12-11

## 2024-12-11 DIAGNOSIS — H40.1133: ICD-10-CM

## 2024-12-11 PROCEDURE — 92202 OPSCPY EXTND ON/MAC DRAW: CPT

## 2024-12-11 PROCEDURE — 92012 INTRM OPH EXAM EST PATIENT: CPT

## 2024-12-11 PROCEDURE — 92133 CPTRZD OPH DX IMG PST SGM ON: CPT

## 2025-01-22 ENCOUNTER — FOLLOW UP (OUTPATIENT)
Age: 75
End: 2025-01-22

## 2025-01-22 DIAGNOSIS — H40.1133: ICD-10-CM

## 2025-01-22 DIAGNOSIS — H16.223: ICD-10-CM

## 2025-01-22 PROCEDURE — 92012 INTRM OPH EXAM EST PATIENT: CPT

## 2025-04-22 ENCOUNTER — FOLLOW UP (OUTPATIENT)
Age: 75
End: 2025-04-22

## 2025-04-22 DIAGNOSIS — H16.223: ICD-10-CM

## 2025-04-22 DIAGNOSIS — H40.1133: ICD-10-CM

## 2025-04-22 PROCEDURE — 92012 INTRM OPH EXAM EST PATIENT: CPT
